# Patient Record
Sex: MALE | URBAN - METROPOLITAN AREA
[De-identification: names, ages, dates, MRNs, and addresses within clinical notes are randomized per-mention and may not be internally consistent; named-entity substitution may affect disease eponyms.]

---

## 2022-06-03 ENCOUNTER — OFFICE VISIT (OUTPATIENT)
Dept: URBAN - METROPOLITAN AREA CLINIC 30 | Facility: CLINIC | Age: 22
End: 2022-06-03
Payer: COMMERCIAL

## 2022-06-03 DIAGNOSIS — H02.9 DISORDER OF EYELID: Primary | ICD-10-CM

## 2022-06-03 PROCEDURE — 92134 CPTRZ OPH DX IMG PST SGM RTA: CPT | Performed by: STUDENT IN AN ORGANIZED HEALTH CARE EDUCATION/TRAINING PROGRAM

## 2022-06-03 PROCEDURE — 92004 COMPRE OPH EXAM NEW PT 1/>: CPT | Performed by: STUDENT IN AN ORGANIZED HEALTH CARE EDUCATION/TRAINING PROGRAM

## 2022-06-03 RX ORDER — BACITRACIN ZINC AND POLYMYXIN B SULFATES 500; 10000 [USP'U]/G; [USP'U]/G
OINTMENT OPHTHALMIC
Qty: 3.5 | Refills: 2 | Status: ACTIVE
Start: 2022-06-03

## 2022-06-03 ASSESSMENT — KERATOMETRY
OS: 42.24
OD: 42.30

## 2022-06-03 ASSESSMENT — VISUAL ACUITY
OS: 20/25
OD: 20/15

## 2022-06-03 ASSESSMENT — INTRAOCULAR PRESSURE
OS: 14
OD: 14

## 2022-06-03 NOTE — IMPRESSION/PLAN
Impression: Disorder of eyelid: H02.9.
-- large 5mm x 6mm oval lesion inf to left lower lid
-- onest 1 mo ago, was initially rx'd augmentin
-- minimal improvement with warm compresses, no longer in any pain Plan: Discussed with pt concern for non-healing lesion. Infected hordeolum/chalazion vs other unk lid lesion. Recommend consult with dermatology for possible removal. 
Pt given names to [facility] for f/u Cont warm compresses 2-3x/day and keep lesion covered with polycin jeremiah 2-3x/day